# Patient Record
Sex: FEMALE | Race: WHITE | NOT HISPANIC OR LATINO | Employment: STUDENT | URBAN - METROPOLITAN AREA
[De-identification: names, ages, dates, MRNs, and addresses within clinical notes are randomized per-mention and may not be internally consistent; named-entity substitution may affect disease eponyms.]

---
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
## 2023-06-27 RX ORDER — LEVOCETIRIZINE DIHYDROCHLORIDE 5 MG/1
5 TABLET, FILM COATED ORAL
COMMUNITY

## 2023-06-27 RX ORDER — FLUTICASONE PROPIONATE 50 MCG
SPRAY, SUSPENSION (ML) NASAL
COMMUNITY

## 2023-06-27 RX ORDER — IBUPROFEN 200 MG
200 TABLET ORAL AS NEEDED
COMMUNITY
End: 2023-08-07 | Stop reason: ALTCHOICE

## 2023-07-23 PROBLEM — M25.561 PAIN IN RIGHT KNEE: Status: ACTIVE | Noted: 2022-05-17

## 2023-07-24 ENCOUNTER — OFFICE VISIT (OUTPATIENT)
Dept: FAMILY MEDICINE CLINIC | Facility: CLINIC | Age: 18
End: 2023-07-24

## 2023-07-24 VITALS
HEIGHT: 65 IN | SYSTOLIC BLOOD PRESSURE: 122 MMHG | DIASTOLIC BLOOD PRESSURE: 88 MMHG | WEIGHT: 138.6 LBS | HEART RATE: 70 BPM | OXYGEN SATURATION: 100 % | BODY MASS INDEX: 23.09 KG/M2

## 2023-07-24 DIAGNOSIS — Z11.59 ENCOUNTER FOR HEPATITIS C SCREENING TEST FOR LOW RISK PATIENT: ICD-10-CM

## 2023-07-24 DIAGNOSIS — Z00.00 WELL ADULT EXAM: Primary | ICD-10-CM

## 2023-07-24 DIAGNOSIS — J30.1 SEASONAL ALLERGIC RHINITIS DUE TO POLLEN: ICD-10-CM

## 2023-07-24 DIAGNOSIS — H52.13 MYOPIA OF BOTH EYES: ICD-10-CM

## 2023-07-24 DIAGNOSIS — R21 RASH AND NONSPECIFIC SKIN ERUPTION: ICD-10-CM

## 2023-07-24 DIAGNOSIS — Z11.4 SCREENING FOR HIV (HUMAN IMMUNODEFICIENCY VIRUS): ICD-10-CM

## 2023-07-24 PROBLEM — M25.561 PAIN IN RIGHT KNEE: Status: RESOLVED | Noted: 2022-05-17 | Resolved: 2023-07-24

## 2023-07-24 PROCEDURE — 99385 PREV VISIT NEW AGE 18-39: CPT | Performed by: NURSE PRACTITIONER

## 2023-07-24 NOTE — PROGRESS NOTES
ADULT ANNUAL [facility] IN PARTNERSHIP WITH [facility]    NAME: Cortney James  AGE: 19 y.o. SEX: female  : 2005     DATE: 2023     Assessment and Plan:     Problem List Items Addressed This Visit        Respiratory    Allergic rhinitis     Well-controlled with Xyzal            Musculoskeletal and Integument    Rash and nonspecific skin eruption     Patient reporting history of rash which occurs occasionally on her fingers, bilaterally on hands. She reports rash occurs as small "bumps "and turns into dry patches. Patient also reporting itchy legs, which occasionally occur as a rash as well. Patient without symptoms today. Advised patient on using dye and fragrance free lotions, washes, and detergent. She may use hydrocortisone on rash next time it appears. Patient is to make an appointment when the rash is occurring for better assessment. Patient verbalized understanding. Other    Encounter for hepatitis C screening test for low risk patient    Relevant Orders    Hepatitis C Ab W/Refl To HCV RNA, Qn, PCR    Myopia of both eyes    Screening for HIV (human immunodeficiency virus)    Relevant Orders    HIV 1/2 AG/AB W REFLEX [facility] and [facility] only    Well adult exam - Primary     Patient here for well exam and to establish care. Patient reports occasional rash on fingers and legs, but is not showing symptoms today. Patient denies further concerns at this time. Will order labs on patient, patient instructed to fast prior to completing labs. Advised patient on well-balanced diet and exercise goals of 30 minutes a day 5 days a week. Relevant Orders    CBC and differential    Comprehensive metabolic panel       Immunizations and preventive care screenings were discussed with patient today. Appropriate education was printed on patient's after visit summary.     Counseling:  Alcohol/drug use: discussed moderation in alcohol intake, the recommendations for healthy alcohol use, and avoidance of illicit drug use. Dental Health: discussed importance of regular tooth brushing, flossing, and dental visits. Injury prevention: discussed safety/seat belts, safety helmets, smoke detectors, carbon dioxide detectors, and smoking near bedding or upholstery. Sexual health: discussed sexually transmitted diseases, partner selection, use of condoms, avoidance of unintended pregnancy, and contraceptive alternatives. Exercise: the importance of regular exercise/physical activity was discussed. Recommend exercise 3-5 times per week for at least 30 minutes. Depression Screening and Follow-up Plan: Patient was screened for depression during today's encounter. They screened negative with a PHQ-2 score of 0. Return in about 1 year (around 7/24/2024) for Annual physical.     Chief Complaint:     Chief Complaint   Patient presents with   • Physical Exam     Est care      History of Present Illness:     Adult Annual Physical   Patient here for a comprehensive physical exam. The patient reports no problems. Diet and Physical Activity  Diet/Nutrition: well balanced diet. Exercise: 3-4 times a week on average. Depression Screening  PHQ-2/9 Depression Screening    Little interest or pleasure in doing things: 0 - not at all  Feeling down, depressed, or hopeless: 0 - not at all  PHQ-2 Score: 0  PHQ-2 Interpretation: Negative depression screen       General Health  Sleep: sleeps well. Hearing: normal - bilateral.  Vision: most recent eye exam <1 year ago, wears glasses and wears contacts. Dental: regular dental visits. Review of Systems:     Review of Systems   Constitutional: Negative. HENT: Negative. Eyes: Negative. Respiratory: Negative. Cardiovascular: Negative. Gastrointestinal: Negative. Genitourinary: Negative. Musculoskeletal: Negative. Skin: Negative. Neurological: Negative. Hematological: Negative. Psychiatric/Behavioral: Negative. Past Medical History:     History reviewed. No pertinent past medical history. Past Surgical History:     History reviewed. No pertinent surgical history. Social History:     Social History     Socioeconomic History   • Marital status: Single     Spouse name: None   • Number of children: None   • Years of education: None   • Highest education level: None   Occupational History   • None   Tobacco Use   • Smoking status: Never   • Smokeless tobacco: Never   Substance and Sexual Activity   • Alcohol use: Never   • Drug use: Never   • Sexual activity: Yes     Partners: Male     Birth control/protection: Condom Male   Other Topics Concern   • None   Social History Narrative   • None     Social Determinants of Health     Financial Resource Strain: Not on file   Food Insecurity: Not on file   Transportation Needs: Not on file   Physical Activity: Not on file   Stress: Not on file   Social Connections: Not on file   Intimate Partner Violence: Not on file   Housing Stability: Not on file      Family History:     Family History   Problem Relation Age of Onset   • Diabetes Father    • Cancer Maternal Grandmother         Ovraian Cancer      Current Medications:     Current Outpatient Medications   Medication Sig Dispense Refill   • fluticasone (Flonase Allergy Relief) 50 mcg/act nasal spray      • ibuprofen (MOTRIN) 200 mg tablet Take 200 mg by mouth if needed     • levocetirizine (XYZAL) 5 MG tablet Take 5 mg by mouth       No current facility-administered medications for this visit. Allergies: Allergies   Allergen Reactions   • Pollen Extract Other (See Comments)     Per mom, runny nose, watery, itchy eyes.       Physical Exam:     /88 (BP Location: Left arm, Patient Position: Sitting, Cuff Size: Standard)   Pulse 70   Ht 5' 5" (1.651 m)   Wt 62.9 kg (138 lb 9.6 oz)   SpO2 100%   BMI 23.06 kg/m²     Physical Exam  Vitals and nursing note reviewed. Constitutional:       General: She is not in acute distress. Appearance: Normal appearance. She is well-developed. HENT:      Head: Normocephalic and atraumatic. Right Ear: Tympanic membrane, ear canal and external ear normal. There is no impacted cerumen. Left Ear: Tympanic membrane, ear canal and external ear normal. There is no impacted cerumen. Nose: Nose normal. No congestion. Mouth/Throat:      Mouth: Mucous membranes are moist.   Eyes:      General:         Right eye: No discharge. Left eye: No discharge. Conjunctiva/sclera: Conjunctivae normal.      Pupils: Pupils are equal, round, and reactive to light. Cardiovascular:      Rate and Rhythm: Normal rate and regular rhythm. Heart sounds: No murmur heard. Pulmonary:      Effort: Pulmonary effort is normal. No respiratory distress. Breath sounds: Normal breath sounds. Abdominal:      General: Bowel sounds are normal.      Palpations: Abdomen is soft. Tenderness: There is no abdominal tenderness. There is no right CVA tenderness or left CVA tenderness. Musculoskeletal:         General: No swelling. Cervical back: Normal range of motion and neck supple. Right lower leg: No edema. Left lower leg: No edema. Skin:     General: Skin is warm and dry. Capillary Refill: Capillary refill takes less than 2 seconds. Neurological:      Mental Status: She is alert and oriented to person, place, and time. Psychiatric:         Mood and Affect: Mood normal.         Behavior: Behavior normal.         Thought Content:  Thought content normal.         Judgment: Judgment normal.          Tommie Torres, [address] WITH [facility]

## 2023-07-24 NOTE — ASSESSMENT & PLAN NOTE
Patient reporting history of rash which occurs occasionally on her fingers, bilaterally on hands. She reports rash occurs as small "bumps "and turns into dry patches. Patient also reporting itchy legs, which occasionally occur as a rash as well. Patient without symptoms today. Advised patient on using dye and fragrance free lotions, washes, and detergent. She may use hydrocortisone on rash next time it appears. Patient is to make an appointment when the rash is occurring for better assessment. Patient verbalized understanding.

## 2023-07-24 NOTE — ASSESSMENT & PLAN NOTE
Patient here for well exam and to establish care. Patient reports occasional rash on fingers and legs, but is not showing symptoms today. Patient denies further concerns at this time. Will order labs on patient, patient instructed to fast prior to completing labs. Advised patient on well-balanced diet and exercise goals of 30 minutes a day 5 days a week.

## 2023-07-24 NOTE — PATIENT INSTRUCTIONS
Wellness Visit for Adults   AMBULATORY CARE:   A wellness visit  is when you see your healthcare provider to get screened for health problems. Your healthcare provider will also give you advice on how to stay healthy. Write down your questions so you remember to ask them. Ask your healthcare provider how often you should have a wellness visit. What happens at a wellness visit:  Your healthcare provider will ask about your health, and your family history of health problems. This includes high blood pressure, heart disease, and cancer. He or she will ask if you have symptoms that concern you, if you smoke, and about your mood. You may also be asked about your intake of medicines, supplements, food, and alcohol. Any of the following may be done: Your weight  will be checked. Your height may also be checked so your body mass index (BMI) can be calculated. Your BMI shows if you are at a healthy weight. Your blood pressure  and heart rate will be checked. Your temperature may also be checked. Blood and urine tests  may be done. Blood tests may be done to check your cholesterol levels. Abnormal cholesterol levels increase your risk for heart disease and stroke. You may also need a blood or urine test to check for diabetes if you are at increased risk. Urine tests may be done to look for signs of an infection or kidney disease. A physical exam  includes checking your heartbeat and lungs with a stethoscope. Your healthcare provider may also check your skin to look for sun damage. Screening tests  may be recommended. A screening test is done to check for diseases that may not cause symptoms. The screening tests you may need depend on your age, gender, family history, and lifestyle habits. For example, colorectal screening may be recommended if you are 48years old or older. Screening tests you need if you are a woman:   A Pap smear  is used to screen for cervical cancer.  Pap smears are usually done every 3 to 5 years depending on your age. You may need them more often if you have had abnormal Pap smear test results in the past. Ask your healthcare provider how often you should have a Pap smear. A mammogram  is an x-ray of your breasts to screen for breast cancer. Experts recommend mammograms every 2 years starting at age 48 years. You may need a mammogram at age 52 years or younger if you have an increased risk for breast cancer. Talk to your healthcare provider about when you should start having mammograms and how often you need them. Vaccines you may need:   Get an influenza vaccine  every year. The influenza vaccine protects you from the flu. Several types of viruses cause the flu. The viruses change over time, so new vaccines are made each year. Get a tetanus-diphtheria (Td) booster vaccine  every 10 years. This vaccine protects you against tetanus and diphtheria. Tetanus is a severe infection that may cause painful muscle spasms and lockjaw. Diphtheria is a severe bacterial infection that causes a thick covering in the back of your mouth and throat. Get a human papillomavirus (HPV) vaccine  if you are female and aged 23 to 32 or male 23 to 24 and never received it. This vaccine protects you from HPV infection. HPV is the most common infection spread by sexual contact. HPV may also cause vaginal, penile, and anal cancers. Get a pneumococcal vaccine  if you are aged 72 years or older. The pneumococcal vaccine is an injection given to protect you from pneumococcal disease. Pneumococcal disease is an infection caused by pneumococcal bacteria. The infection may cause pneumonia, meningitis, or an ear infection. Get a shingles vaccine  if you are 60 or older, even if you have had shingles before. The shingles vaccine is an injection to protect you from the varicella-zoster virus. This is the same virus that causes chickenpox.  Shingles is a painful rash that develops in people who had chickenpox or have been exposed to the virus. How to eat healthy:  My Plate is a model for planning healthy meals. It shows the types and amounts of foods that should go on your plate. Fruits and vegetables make up about half of your plate, and grains and protein make up the other half. A serving of dairy is included on the side of your plate. The amount of calories and serving sizes you need depends on your age, gender, weight, and height. Examples of healthy foods are listed below:  Eat a variety of vegetables  such as dark green, red, and orange vegetables. You can also include canned vegetables low in sodium (salt) and frozen vegetables without added butter or sauces. Eat a variety of fresh fruits , canned fruit in 100% juice, frozen fruit, and dried fruit. Include whole grains. At least half of the grains you eat should be whole grains. Examples include whole-wheat bread, wheat pasta, brown rice, and whole-grain cereals such as oatmeal.    Eat a variety of protein foods such as seafood (fish and shellfish), lean meat, and poultry without skin (turkey and chicken). Examples of lean meats include pork leg, shoulder, or tenderloin, and beef round, sirloin, tenderloin, and extra lean ground beef. Other protein foods include eggs and egg substitutes, beans, peas, soy products, nuts, and seeds. Choose low-fat dairy products such as skim or 1% milk or low-fat yogurt, cheese, and cottage cheese. Limit unhealthy fats  such as butter, hard margarine, and shortening. Exercise:  Exercise at least 30 minutes per day on most days of the week. Some examples of exercise include walking, biking, dancing, and swimming. You can also fit in more physical activity by taking the stairs instead of the elevator or parking farther away from stores. Include muscle strengthening activities 2 days each week. Regular exercise provides many health benefits.  It helps you manage your weight, and decreases your risk for type 2 diabetes, heart disease, stroke, and high blood pressure. Exercise can also help improve your mood. Ask your healthcare provider about the best exercise plan for you. General health and safety guidelines:   Do not smoke. Nicotine and other chemicals in cigarettes and cigars can cause lung damage. Ask your healthcare provider for information if you currently smoke and need help to quit. E-cigarettes or smokeless tobacco still contain nicotine. Talk to your healthcare provider before you use these products. Limit alcohol. A drink of alcohol is 12 ounces of beer, 5 ounces of wine, or 1½ ounces of liquor. Lose weight, if needed. Being overweight increases your risk of certain health conditions. These include heart disease, high blood pressure, type 2 diabetes, and certain types of cancer. Protect your skin. Do not sunbathe or use tanning beds. Use sunscreen with a SPF 15 or higher. Apply sunscreen at least 15 minutes before you go outside. Reapply sunscreen every 2 hours. Wear protective clothing, hats, and sunglasses when you are outside. Drive safely. Always wear your seatbelt. Make sure everyone in your car wears a seatbelt. A seatbelt can save your life if you are in an accident. Do not use your cell phone when you are driving. This could distract you and cause an accident. Pull over if you need to make a call or send a text message. Practice safe sex. Use latex condoms if are sexually active and have more than one partner. Your healthcare provider may recommend screening tests for sexually transmitted infections (STIs). Wear helmets, lifejackets, and protective gear. Always wear a helmet when you ride a bike or motorcycle, go skiing, or play sports that could cause a head injury. Wear protective equipment when you play sports. Wear a lifejacket when you are on a boat or doing water sports.     © Copyright [employer] B[employer]bara 2022 Information is for End User's use only and may not be sold, redistributed or otherwise used for commercial purposes. The above information is an  only. It is not intended as medical advice for individual conditions or treatments. Talk to your doctor, nurse or pharmacist before following any medical regimen to see if it is safe and effective for you.

## 2023-08-07 ENCOUNTER — OFFICE VISIT (OUTPATIENT)
Dept: FAMILY MEDICINE CLINIC | Facility: CLINIC | Age: 18
End: 2023-08-07

## 2023-08-07 VITALS
SYSTOLIC BLOOD PRESSURE: 118 MMHG | HEIGHT: 65 IN | WEIGHT: 141.8 LBS | OXYGEN SATURATION: 99 % | DIASTOLIC BLOOD PRESSURE: 70 MMHG | HEART RATE: 66 BPM | BODY MASS INDEX: 23.63 KG/M2

## 2023-08-07 DIAGNOSIS — R21 RASH AND NONSPECIFIC SKIN ERUPTION: Primary | ICD-10-CM

## 2023-08-07 PROCEDURE — 99213 OFFICE O/P EST LOW 20 MIN: CPT | Performed by: NURSE PRACTITIONER

## 2023-08-07 RX ORDER — METHYLPREDNISOLONE 4 MG/1
TABLET ORAL
Qty: 21 EACH | Refills: 0
Start: 2023-08-07

## 2023-08-07 RX ORDER — TRIAMCINOLONE ACETONIDE 5 MG/G
CREAM TOPICAL 2 TIMES DAILY
Qty: 15 G | Refills: 0 | Status: SHIPPED | OUTPATIENT
Start: 2023-08-07

## 2023-08-07 NOTE — ASSESSMENT & PLAN NOTE
Patient with rash on legs bilaterally, more extensively on left leg. Patient reports rash has been present for 1 week, started on left knee and has extended to left leg and right leg. Patient does work in , and reports hand-foot-and-mouth disease has been traveling through the . No lesions noted in mouth, hands, feet. Discussed differentials with patient. Will give triamcinolone for pruritus, and Medrol Dosepak. Rash does appear to be viral, however given patient's previous rashes will send referral to dermatology at this time. If signs of infection arise, patient should follow back up in office. Follow-up in office if no improvement or worsening of symptoms.

## 2023-08-07 NOTE — PROGRESS NOTES
Name: Marc Elliott      : 2005      MRN: [id number]  Encounter Provider: NEERAJ Gaviria  Encounter Date: 2023   Encounter department: [address] [place] [address] IN PARTNERSHIP WITH [facility]    Assessment & Plan     1. Rash and nonspecific skin eruption  Assessment & Plan:  Patient with rash on legs bilaterally, more extensively on left leg. Patient reports rash has been present for 1 week, started on left knee and has extended to left leg and right leg. Patient does work in , and reports hand-foot-and-mouth disease has been traveling through the . No lesions noted in mouth, hands, feet. Discussed differentials with patient. Will give triamcinolone for pruritus, and Medrol Dosepak. Rash does appear to be viral, however given patient's previous rashes will send referral to dermatology at this time. If signs of infection arise, patient should follow back up in office. Follow-up in office if no improvement or worsening of symptoms. Orders:  -     triamcinolone (KENALOG) 0.5 % cream; Apply topically 2 (two) times a day  -     Ambulatory Referral to Dermatology; Future  -     methylPREDNISolone 4 MG tablet therapy pack; Use as directed on package           Subjective      Subjective    Marc Elliott is a 19 y.o. female who presents for evaluation of a rash involving the lower extremity. Rash started 1 weeks ago. Lesions are red, and raised in texture. Rash has changed over time. Rash is pruritic and sensitive. Associated symptoms: none. Patient denies: abdominal pain, arthralgia, congestion, cough, crankiness, decrease in appetite, fever, headache, irritability, myalgia, nausea, sore throat and vomiting. 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  Patient has not had new exposures (soaps, lotions, laundry detergents, foods, medications, plants, insects or animals). Review of Systems   Constitutional: Negative. HENT: Negative. Eyes: Negative. Respiratory: Negative. Cardiovascular: Negative. Gastrointestinal: Negative. Musculoskeletal: Negative. Skin: Positive for rash. Negative for color change, pallor and wound. Neurological: Negative. Psychiatric/Behavioral: Negative. Current Outpatient Medications on File Prior to Visit   Medication Sig   • fluticasone (Flonase Allergy Relief) 50 mcg/act nasal spray    • levocetirizine (XYZAL) 5 MG tablet Take 5 mg by mouth   • [DISCONTINUED] ibuprofen (MOTRIN) 200 mg tablet Take 200 mg by mouth if needed       Objective     /70 (BP Location: Left arm, Patient Position: Sitting, Cuff Size: Standard)   Pulse 66   Ht 5' 5" (1.651 m)   Wt 64.3 kg (141 lb 12.8 oz)   SpO2 99%   BMI 23.60 kg/m²     Physical Exam  Constitutional:       General: She is not in acute distress. Appearance: Normal appearance. HENT:      Head: Normocephalic and atraumatic. Right Ear: External ear normal.      Left Ear: External ear normal.      Nose: Nose normal.      Mouth/Throat:      Mouth: Mucous membranes are moist.      Pharynx: Oropharynx is clear. No oropharyngeal exudate or posterior oropharyngeal erythema. Eyes:      General:         Right eye: No discharge. Left eye: No discharge. Conjunctiva/sclera: Conjunctivae normal.   Cardiovascular:      Rate and Rhythm: Normal rate and regular rhythm. Heart sounds: Normal heart sounds. No murmur heard. Pulmonary:      Effort: Pulmonary effort is normal.      Breath sounds: Normal breath sounds. Abdominal:      General: Bowel sounds are normal.      Palpations: Abdomen is soft. Tenderness: There is no right CVA tenderness or left CVA tenderness. Musculoskeletal:         General: Normal range of motion. Cervical back: Normal range of motion. Right lower leg: No edema. Left lower leg: No edema. Lymphadenopathy:      Cervical: No cervical adenopathy. Skin:     General: Skin is warm and dry. Findings: Rash (scattered red raised patches and papules noted on left leg, red papules noted on right leg. No signs of infection noted. ) present. Neurological:      Mental Status: She is alert and oriented to person, place, and time. Psychiatric:         Mood and Affect: Mood normal.         Behavior: Behavior normal.         Thought Content:  Thought content normal.         Judgment: Judgment normal.       NEERAJ Mix

## 2023-08-07 NOTE — PATIENT INSTRUCTIONS
Acute Rash   WHAT YOU NEED TO KNOW:   A rash is irritated, red, or itchy skin or mucus membranes, such as the lining of your nose or throat. Acute means the rash starts suddenly, worsens quickly, and lasts a short time. Common causes include a disease or infection, a reaction to something you are allergic to, or certain medicines. DISCHARGE INSTRUCTIONS:   Return to the emergency department if:   You have sudden trouble breathing or chest pain. You are vomiting, have a headache or muscle aches, and your throat hurts. Call your doctor or dermatologist if:   You have a fever. You get open wounds from scratching your skin, or you have a wound that is red, swollen, or painful. Your rash lasts longer than 3 months. You have swelling or pain in your joints. You have questions or concerns about your condition or care. Medicines:  If your rash does not go away on its own, you may need the following medicines:  Antihistamines  may be given to help decrease itching. Steroids  may be given to decrease inflammation. Antibiotics  help fight or prevent a bacterial infection. Take your medicine as directed. Contact your healthcare provider if you think your medicine is not helping or if you have side effects. Tell your provider if you are allergic to any medicine. Keep a list of the medicines, vitamins, and herbs you take. Include the amounts, and when and why you take them. Bring the list or the pill bottles to follow-up visits. Carry your medicine list with you in case of an emergency. Prevent a rash or care for your skin when you have a rash:  Dry skin can lead to more problems. Do not scratch your skin if it itches. You may cause a skin infection by scratching. The following may prevent dry skin, and help your skin look better:  Help soothe your rash. Apply thick cream lotions or petroleum jelly. Cool compresses may also soothe your skin.  Apply a cool compress or a cool, wet towel, and then cover it with a dry towel. Use lukewarm water when you bathe. Hot water may damage your skin more. Pat your skin dry. Do not rub your skin with a towel. Use detergents, soaps, shampoos, and bubble baths  made for sensitive skin. Wear clothes made of cotton instead of nylon or wool. Cotton is softer, so it will not hurt your skin as much. Follow up with your healthcare providers as directed:  A dermatologist may help find the cause of your rash or help plan or change treatment. A dietitian may help with meal planning if you have a food allergy. Write down your questions so you remember to ask them during your visits. © Copyright [facility] 2022 Information is for End User's use only and may not be sold, redistributed or otherwise used for commercial purposes. The above information is an  only. It is not intended as medical advice for individual conditions or treatments. Talk to your doctor, nurse or pharmacist before following any medical regimen to see if it is safe and effective for you.

## 2023-08-28 DIAGNOSIS — N92.0 MENORRHAGIA WITH REGULAR CYCLE: ICD-10-CM

## 2023-08-28 DIAGNOSIS — N94.6 MENSTRUAL CRAMPS: Primary | ICD-10-CM

## 2023-09-08 ENCOUNTER — OFFICE VISIT (OUTPATIENT)
Age: 18
End: 2023-09-08
Payer: COMMERCIAL

## 2023-09-08 VITALS
BODY MASS INDEX: 23.79 KG/M2 | WEIGHT: 142.8 LBS | HEIGHT: 65 IN | DIASTOLIC BLOOD PRESSURE: 70 MMHG | SYSTOLIC BLOOD PRESSURE: 120 MMHG

## 2023-09-08 DIAGNOSIS — Z11.3 SCREEN FOR STD (SEXUALLY TRANSMITTED DISEASE): ICD-10-CM

## 2023-09-08 DIAGNOSIS — N94.6 MENSTRUAL CRAMPS: Primary | ICD-10-CM

## 2023-09-08 PROCEDURE — 99203 OFFICE O/P NEW LOW 30 MIN: CPT | Performed by: PHYSICIAN ASSISTANT

## 2023-09-08 PROCEDURE — 87591 N.GONORRHOEAE DNA AMP PROB: CPT | Performed by: PHYSICIAN ASSISTANT

## 2023-09-08 PROCEDURE — 87491 CHLMYD TRACH DNA AMP PROBE: CPT | Performed by: PHYSICIAN ASSISTANT

## 2023-09-08 RX ORDER — NORETHINDRONE ACETATE AND ETHINYL ESTRADIOL 1; .02 MG/1; MG/1
1 TABLET ORAL DAILY
Qty: 30 TABLET | Refills: 3 | Status: SHIPPED | OUTPATIENT
Start: 2023-09-08

## 2023-09-08 NOTE — PATIENT INSTRUCTIONS
Birth Control Pill Instructions    1. Pills may be started on the first day of your period or the Sunday after the first day of your period. 2. It is important that you take the pill at the same time each day. 3. If you miss a pill, take it as soon as you remember it and continue pills as usual.  If you miss 2 days, double up the next 2 days, one in the AM and one in the PM.  If you miss more than 2 days, stop the pills, wait for a period, then start a new pack on Sunday following the first day of your period or on the first day of your period. 4. Your period may be lighter and shorter on the birth control pills. Any spotting is considered a period if it is at the time you should have your period. 5. Bleeding other than a period is likely to happen the first 2 to 3 cycles on the pill. Continue taking the pill and most often it resolves. If it persists past 3 months, call the office. 6. If no bleeding occurs during the week you should get your menses, have a pregnancy test done (you can do a urine test) before starting a new pack. If no bleeding occurs for 2 consecutive months, please call the office. 7. Do not count on the pill for birth control until you have been on it for one month. 8. If you miss a pill or take a pill late, please use back up birth control for the rest of the pack. 9. Please remember antibiotics can decrease the effectiveness of the pill. Use back up birth control for the rest of the pack if you are taking an antibiotic.

## 2023-09-08 NOTE — PROGRESS NOTES
Patient c/o worsening cramps with her menses. Patient says she has always had cramps with her menses but they seem to be getting worse. Pain starts a few hours before the bleeding and bad X 2 days. Pain goes into legs. Worse in left leg than right leg. Menses regular every 29 days and last 4-5 days. Changing super tampon every 5 hours. Patient says her mother would never let her miss school/ work for the cramps. She has no STD concerns. She has no pain with intercourse. Denies vaginal discharge, itching, odor. She is using condoms for birth control. Has taken ibuprofen with minimal relief. No smoking  No migraine with aura  No hx or family history blood clot/stroke    Discussed option of birth control for cramps: pills vs patch, vs ring, vs fariba, vs shot vs IUD. Discussed ibuprofen. Discussed laparoscopy. Patient would like to try OCP. Pill instructions reviewed and hand out given. Reviewed side effects including but not limited to nausea/ headache, blood clot, stroke, weight gain. Discussed that while the pill provides protection against pregnancy it does not protect against STD. Patient declines pelvic exam today. Abdomen is soft , non tender to palpation    Assessment:  Dysmenorrhea    PLan:  loestrin 1/20 # 30 one daily 3RF  F/u in 3 months  Recommend gardasil vaccine  Gonorrhea/ chlamydia collected today. Total time spent today 35 minutes. Greater than 50% of total time was spent with the patient and / or family counseling and / or coordination of care.

## 2023-09-09 ENCOUNTER — PATIENT MESSAGE (OUTPATIENT)
Age: 18
End: 2023-09-09

## 2023-09-09 DIAGNOSIS — Z80.41 FAMILY HISTORY OF OVARIAN CANCER: Primary | ICD-10-CM

## 2023-09-11 ENCOUNTER — TELEPHONE (OUTPATIENT)
Dept: HEMATOLOGY ONCOLOGY | Facility: CLINIC | Age: 18
End: 2023-09-11

## 2023-09-11 LAB
C TRACH DNA SPEC QL NAA+PROBE: NEGATIVE
N GONORRHOEA DNA SPEC QL NAA+PROBE: NEGATIVE

## 2023-09-11 NOTE — TELEPHONE ENCOUNTER
From: Meagan Dickson  To: Alejandro Ryan  Sent: 9/9/2023 6:47 AM EDT  Subject: Genetic Testing     Good morning,  After thinking about everything from yesterday’s appointment, I would like to go through with the referral to the genetic testing for the ovarian cancer, just so we can see if there is a possibility of me having that.      Thank you,  Meagan Dickson

## 2023-09-11 NOTE — TELEPHONE ENCOUNTER
I spoke with Cortney to schedule their consultation with Cancer Risk and Genetics. Scheduling Outcome: Patient is scheduled for an appointment on 2/22/24 at 2:00PM with Marily Sanders    Personal/Family History Related to Appointment:     Personal History of Cancer: Patient reports no personal history of cancer    Family History of Cancer: Patient reports family history of ovarian ca grandmother m, skin ca father    Is patient of [address] [place] [address] [address]?: No    History of Genetic Testing:  Personal History of Genetic Testing: Patient report no personal history of Genetic Testing. Family History of Genetic Testing: Patient reports that member(s) of their family have had Genetic Testing. Details: Patient believes that her mother has done genetic testing but is unaware of the details. [employer]:  Is patient able to complete our family history questionnaire on a computer?:  Yes    Patient's preferred e-mail address: [email or web address]

## 2023-09-22 PROBLEM — Z00.00 WELL ADULT EXAM: Status: RESOLVED | Noted: 2023-07-24 | Resolved: 2023-09-22

## 2023-12-07 NOTE — PROGRESS NOTES
Pt is here for follow up. She is taking loestrin 1/20 for dysmenorrhea. She says the cramps are greatly improved. The bleeding is less. The 1st two pack got menses during 3rd week. On 3rd week of pills now and no bleeding this month. She is no having any spotting or bleeding in between menses. She denies headache, blurry vision, abdominal pain, leg pain, or chest pain. She is happy with the Loestrin 1/20 and wishes to continue. We did talk about taking the pill continuously if desired. Patient has had some nipple itching on left side. Has hx of dry skin. She will try moisturizers and hydrocortisone cream.  Will call if persists. Declines breast exam.    Hx migraine with aura: no  Hx blood clot/ stroke: no  Smoker: no    Current Outpatient Medications on File Prior to Visit   Medication Sig Dispense Refill    fluticasone (Flonase Allergy Relief) 50 mcg/act nasal spray       Junel FE 1/20 1-20 MG-MCG per tablet TAKE 1 TABLET BY MOUTH DAILY PLEASE GIVE 28 DAY PACK 84 tablet 0    levocetirizine (XYZAL) 5 MG tablet Take 5 mg by mouth      triamcinolone (KENALOG) 0.5 % cream Apply topically 2 (two) times a day 15 g 0     No current facility-administered medications on file prior to visit. Vitals:    12/08/23 0658   BP: 116/64   Pulse: 81   Temp: 97.9 °F (36.6 °C)   SpO2: 98%       Assessment:  dysmenorrhea    Plan:  Continue loestrin 1`/20  Loestrin 1/20 # 84 one daily 3RF  F/u 1 year or sooner if needed  10 minutes of face to face time spent with patient, >50% of which was spent discussing dysmenorrhea.
No

## 2023-12-08 ENCOUNTER — OFFICE VISIT (OUTPATIENT)
Age: 18
End: 2023-12-08

## 2023-12-08 VITALS
OXYGEN SATURATION: 98 % | HEART RATE: 81 BPM | BODY MASS INDEX: 24.79 KG/M2 | TEMPERATURE: 97.9 F | SYSTOLIC BLOOD PRESSURE: 116 MMHG | DIASTOLIC BLOOD PRESSURE: 64 MMHG | HEIGHT: 65 IN | WEIGHT: 148.8 LBS

## 2023-12-08 DIAGNOSIS — N94.6 MENSTRUAL CRAMPS: ICD-10-CM

## 2023-12-08 DIAGNOSIS — N94.6 DYSMENORRHEA: Primary | ICD-10-CM

## 2023-12-08 RX ORDER — NORETHINDRONE ACETATE AND ETHINYL ESTRADIOL 1MG-20(21)
1 KIT ORAL DAILY
Qty: 84 TABLET | Refills: 3 | Status: SHIPPED | OUTPATIENT
Start: 2023-12-08

## 2023-12-15 ENCOUNTER — TELEPHONE (OUTPATIENT)
Dept: OBGYN CLINIC | Facility: CLINIC | Age: 18
End: 2023-12-15

## 2023-12-15 NOTE — TELEPHONE ENCOUNTER
----- Message from Marcie Morgan MA sent at 12/15/2023 11:30 AM EST -----  Regarding: FW: BC & Period  Contact: [phone]    ----- Message -----  From: Cortney James  Sent: 12/15/2023   8:29 AM EST  To: #  Subject: BC & Period                                      Good Morning,  This is Cortney James. I wanted to send a message because we had talked about how I should be getting my period during the last week of pills. I had light spotting on 12/12 and 12/13, which is during my last week of pills, but have had nothing else. I’m not sure if that is my “period” or if it’s something else. I just wanted to message to ask.     Thank you,    Cortney James

## 2023-12-15 NOTE — TELEPHONE ENCOUNTER
Left message on machine for pt to call office re: patient's message     Will respond via my chart

## 2024-01-02 ENCOUNTER — TELEPHONE (OUTPATIENT)
Dept: OBGYN CLINIC | Facility: CLINIC | Age: 19
End: 2024-01-02

## 2024-01-02 ENCOUNTER — PATIENT MESSAGE (OUTPATIENT)
Age: 19
End: 2024-01-02

## 2024-01-02 NOTE — TELEPHONE ENCOUNTER
----- Message from Sisi Al LPN sent at 1/2/2024  7:17 AM EST -----  Regarding: FW: Question/Concern  Contact: [phone]  Please advise  ----- Message -----  From: Cortney James  Sent: 1/2/2024   7:12 AM EST  To: #  Subject: Question/Concern                                 Good Morning,   I did look at my birth control and it does say the same thing in the bottom as my pack I am on right now.     I also had another question, over the weekend I started to have brown spotting and I still have it and I am not due for my period until 1/9/23 and I’m not sure why the spotting is happening.    Thank you,   Cortney James

## 2024-01-02 NOTE — PATIENT COMMUNICATION
Spoke with patient.   Had BTB on 2nd week of pills.  No missed pills, no antibiotics.  Patient just picked up another 3 packs of pills.  She will continue for now and call if continued BTB.  If continued will want to switch OCP.   She will finish pack since just light spotting.  If bleeding gets heavy like menses will call and then will have patient stop pack early.    Patient will do HPT but doubts chance of pregnancy.

## 2024-02-22 ENCOUNTER — DOCUMENTATION (OUTPATIENT)
Dept: GENETICS | Facility: CLINIC | Age: 19
End: 2024-02-22

## 2024-02-22 ENCOUNTER — CLINICAL SUPPORT (OUTPATIENT)
Dept: GENETICS | Facility: CLINIC | Age: 19
End: 2024-02-22
Payer: COMMERCIAL

## 2024-02-22 DIAGNOSIS — Z80.41 FAMILY HISTORY OF OVARIAN CANCER: ICD-10-CM

## 2024-02-22 PROCEDURE — 36415 COLL VENOUS BLD VENIPUNCTURE: CPT

## 2024-02-22 NOTE — PROGRESS NOTES
"Pre-Test Genetic Counseling Consult Note    Patient Name: Cortney James   /Age: 2005/18 y.o.  Referring Provider: Lico Queen PA-C     Date of Service: 2024  Genetic Counselor: Chloe Christian MS, [facility] [place]  Interpretation Services: None  Location: In-person consult at [place]  Length of Visit: 30 Minutes    Cortney was referred to the [facility] Cancer Risk and Genetic Assessment Program due to her family history of ovarian cancer . she presents today to discuss the possibility of a hereditary cancer syndrome, options for genetic testing, and implications for her and her family.         Cancer History and Treatment:   Personal History:   Oncology History    No history exists.       Screening Hx:   Breast:  Breast Imaging: none    Colon:  Colonoscopy: none    Gynecologic:  Ovaries and Uterus intact     Skin:  Saw derm over the summer    Other screening: none    Reproductive History  Age at menarche: 11  Age at first live birth: Nulliparous  Menopause: Premenopausal  Hormone replacement: none    Medical and Surgical History  Pertinent surgical history:   Past Surgical History:   Procedure Laterality Date    ELBOW SURGERY      WISDOM TOOTH EXTRACTION        Pertinent medical history:No past medical history on file.      Other History:  Height:   Ht Readings from Last 1 Encounters:   23 5' 5\" (1.651 m)     Weight:   Wt Readings from Last 1 Encounters:   23 67.5 kg (148 lb 12.8 oz)       Relevant Family History   Patient reports no Ashkenazi Baptist ancestry.     Maternal Family History:  Grandmother (d.late 60s) history of ovarian cancer (dx early 60s)   Great grandfather with history of lung cancer    Paternal Family History:   Father (53) history of basal cell carcinoma   Great grandfather with history of lung cancer    Please refer to the scanned pedigree in the Media Tab for a complete family history     *All history is reported as provided by the patient; records are not available for " review, except where indicated.     Assessment:  We discussed sporadic, familial and hereditary cancer.  We also discussed the many factors that influence our risk for cancer such as age, environmental exposures, lifestyle choices and family history.      We reviewed the indications suggestive of a hereditary predisposition to cancer.    Cortney is unaffected, but is considering genetic testing due to a family history of ovarian cancer. Although Cortney's maternal grandmother is the most informative person to undergo genetic testing, Cortney can consider genetic testing due to the following:   Patient does not have cancer but is the most informative person to test because their grandmother is .    Genetic testing is indicated for Cortney based on the following criteria: Meets NCCN V2.2024 Testing Criteria for Ovarian Cancer Susceptibility Genes: family history of a second-degree relative diagnosed with ovarian cancer    The risks, benefits, and limitations of genetic testing were reviewed with the patient, as well as genetic discrimination laws, and possible test results (positive, negative, variants of uncertain significance) and their clinical implications. If positive for a mutation, options for managing cancer risk including increased surveillance, chemoprevention, and in some cases prophylactic surgery were discussed. Cortney was informed that if a hereditary cancer syndrome was identified in her, first degree relatives (parents, siblings, and children) have a chance of also inheriting the condition. Genetic testing would allow for predictive genetic testing in other relatives, who may also be at risk depending on their degree of relation.     Billing:  Most individuals pay <$100 for hereditary cancer genetic testing. If insurance covers the cost of the testing, individuals may still pay out of pocket secondary to co-pays, co-insurance, or deductibles. If the cost of the testing exceeds $100, the lab  will reach out to the patient via phone or e-mail. The patient will then have the option to proceed with the testing, cancel the testing, or elect the self-pay option of $250. Cortney verbalized understanding.     Plan: Patient decided to proceed with testing and provided consent.    Summary:     Sample Collection:  The patient's blood sample was drawn in the office on 2/22 by medical assistant Gaston Simmons    Genetic Testing Preformed: CustomNext: Cancer® +Collaborative Software Initiativensight® (59 genes): APC, JOAN, AXIN2, BAP1, BARD1, BMPR1A, BRCA1, BRCA2, BRIP1, CDH1, CDK4, CDKN1B, CDKN2A, CHEK2, CTNNA1, DICER1, EGLN1, EPCAM, FH, FLCN, GREM1, HOXB13, KIF1B, KIT, MAX, MEN1, MET, MITF, MLH1, MSH2, MSH3, MSH6, MUTYH, NF1, NTHL1, PALB2, PDGFRA PMS2, POLD1, POLE, POT1, PTEN, RAD51C, RAD51D, RB1, RET, SDHA, SDHAF2, SDHB, SDHC, SDHD, SMAD4, SMARCA4, STK11, [id number], TP53, TSC1, TSC2, VHL     Result Call Information:  In the event that we need to reach Cortney via telephone:  I confirmed the patient's mobile number on file as the best number to call with results  I confirmed with the patient that we can leave a voicemail on the provided numbers    Results take approximately 2-3 weeks to complete once test is started.    Cortney will be notified via [employer] once results are available.      Additional recommendations for surveillance/medical management will be made pending genetic test results.

## 2024-02-22 NOTE — LETTER
"2024     Lico Queen PA-C  [address]  [address]  [place] [id number]    Patient: Cortney James  YOB: 2005  Date of Visit: 2024      Dear Dr. Queen:    Thank you for referring Cortney James to me for evaluation. Below are my notes for this consultation.    If you have questions, please do not hesitate to call me. I look forward to following your patient along with you.         Sincerely,        Chloe Christian        CC: NEERAJ Dorsey        Pre-Test Genetic Counseling Consult Note    Patient Name: Cortney James   /Age: 2005/18 y.o.  Referring Provider: Lico Queen PA-C     Date of Service: 2024  Genetic Counselor: Chloe Christian MS, [facility] [place]  Interpretation Services: None  Location: In-person consult at [place]  Length of Visit: 30 Minutes    Cortney was referred to the [facility] Cancer Risk and Genetic Assessment Program due to her family history of ovarian cancer . she presents today to discuss the possibility of a hereditary cancer syndrome, options for genetic testing, and implications for her and her family.         Cancer History and Treatment:   Personal History:   Oncology History    No history exists.       Screening Hx:   Breast:  Breast Imaging: none    Colon:  Colonoscopy: none    Gynecologic:  Ovaries and Uterus intact     Skin:  Saw derm over the summer    Other screening: none    Reproductive History  Age at menarche: 11  Age at first live birth: Nulliparous  Menopause: Premenopausal  Hormone replacement: none    Medical and Surgical History  Pertinent surgical history:   Past Surgical History:   Procedure Laterality Date   • ELBOW SURGERY     • WISDOM TOOTH EXTRACTION        Pertinent medical history:No past medical history on file.      Other History:  Height:   Ht Readings from Last 1 Encounters:   23 5' 5\" (1.651 m)     Weight:   Wt Readings from Last 1 Encounters:   23 67.5 kg (148 lb 12.8 " oz)       Relevant Family History   Patient reports no Ashkenazi Latter day ancestry.     Maternal Family History:  Grandmother (d.late 60s) history of ovarian cancer (dx early 60s)   Great grandfather with history of lung cancer    Paternal Family History:   Father (53) history of basal cell carcinoma   Great grandfather with history of lung cancer    Please refer to the scanned pedigree in the Media Tab for a complete family history     *All history is reported as provided by the patient; records are not available for review, except where indicated.     Assessment:  We discussed sporadic, familial and hereditary cancer.  We also discussed the many factors that influence our risk for cancer such as age, environmental exposures, lifestyle choices and family history.      We reviewed the indications suggestive of a hereditary predisposition to cancer.    Cortney is unaffected, but is considering genetic testing due to a family history of ovarian cancer. Although Cortney's maternal grandmother is the most informative person to undergo genetic testing, Cortney can consider genetic testing due to the following:   Patient does not have cancer but is the most informative person to test because their grandmother is .    Genetic testing is indicated for Cortney based on the following criteria: Meets NCCN V2.2024 Testing Criteria for Ovarian Cancer Susceptibility Genes: family history of a second-degree relative diagnosed with ovarian cancer    The risks, benefits, and limitations of genetic testing were reviewed with the patient, as well as genetic discrimination laws, and possible test results (positive, negative, variants of uncertain significance) and their clinical implications. If positive for a mutation, options for managing cancer risk including increased surveillance, chemoprevention, and in some cases prophylactic surgery were discussed. Cortney was informed that if a hereditary cancer syndrome was identified  in her, first degree relatives (parents, siblings, and children) have a chance of also inheriting the condition. Genetic testing would allow for predictive genetic testing in other relatives, who may also be at risk depending on their degree of relation.     Billing:  Most individuals pay <$100 for hereditary cancer genetic testing. If insurance covers the cost of the testing, individuals may still pay out of pocket secondary to co-pays, co-insurance, or deductibles. If the cost of the testing exceeds $100, the lab will reach out to the patient via phone or e-mail. The patient will then have the option to proceed with the testing, cancel the testing, or elect the self-pay option of $250. Cortney verbalized understanding.     Plan: Patient decided to proceed with testing and provided consent.    Summary:     Sample Collection:  The patient's blood sample was drawn in the office on 2/22 by medical assistant Gaston Simmons    Genetic Testing Preformed: CustomNext: Cancer® +RNAinsight® (59 genes): APC, JOAN, AXIN2, BAP1, BARD1, BMPR1A, BRCA1, BRCA2, BRIP1, CDH1, CDK4, CDKN1B, CDKN2A, CHEK2, CTNNA1, DICER1, EGLN1, EPCAM, FH, FLCN, GREM1, HOXB13, KIF1B, KIT, MAX, MEN1, MET, MITF, MLH1, MSH2, MSH3, MSH6, MUTYH, NF1, NTHL1, PALB2, PDGFRA PMS2, POLD1, POLE, POT1, PTEN, RAD51C, RAD51D, RB1, RET, SDHA, SDHAF2, SDHB, SDHC, SDHD, SMAD4, SMARCA4, STK11, [id number], TP53, TSC1, TSC2, VHL     Result Call Information:  In the event that we need to reach Cortney via telephone:  I confirmed the patient's mobile number on file as the best number to call with results  I confirmed with the patient that we can leave a voicemail on the provided numbers    Results take approximately 2-3 weeks to complete once test is started.    Cortney will be notified via [email or web address] once results are available.      Additional recommendations for surveillance/medical management will be made pending genetic test results.

## 2024-02-26 DIAGNOSIS — J02.0 RECURRENT STREPTOCOCCAL PHARYNGITIS: Primary | ICD-10-CM

## 2024-03-14 PROBLEM — Z80.41 FAMILY HISTORY OF OVARIAN CANCER: Status: ACTIVE | Noted: 2024-03-14

## 2024-06-12 ENCOUNTER — CLINICAL SUPPORT (OUTPATIENT)
Dept: FAMILY MEDICINE CLINIC | Facility: CLINIC | Age: 19
End: 2024-06-12

## 2024-06-12 DIAGNOSIS — Z11.1 PPD SCREENING TEST: Primary | ICD-10-CM

## 2024-06-12 DIAGNOSIS — Z11.59 NEED FOR HEPATITIS C SCREENING TEST: ICD-10-CM

## 2024-06-12 DIAGNOSIS — Z12.4 SCREENING FOR CERVICAL CANCER: ICD-10-CM

## 2024-06-12 DIAGNOSIS — Z11.4 SCREENING FOR HIV (HUMAN IMMUNODEFICIENCY VIRUS): ICD-10-CM

## 2024-06-12 PROCEDURE — 86580 TB INTRADERMAL TEST: CPT

## 2024-06-14 ENCOUNTER — CLINICAL SUPPORT (OUTPATIENT)
Dept: FAMILY MEDICINE CLINIC | Facility: CLINIC | Age: 19
End: 2024-06-14

## 2024-06-14 DIAGNOSIS — Z11.1 PPD SCREENING TEST: Primary | ICD-10-CM

## 2024-06-14 LAB
INDURATION: 0 MM
TB SKIN TEST: NEGATIVE

## 2024-06-14 NOTE — PROGRESS NOTES
PPD Reading Note  PPD read and results entered in [employer].  Result: 0 mm induration.  Interpretation: negative  If test not read within 48-72 hours of initial placement, patient advised to repeat in other arm 1-3 weeks after this test.  Allergic reaction: no

## 2024-08-01 ENCOUNTER — OFFICE VISIT (OUTPATIENT)
Dept: FAMILY MEDICINE CLINIC | Facility: CLINIC | Age: 19
End: 2024-08-01

## 2024-08-01 VITALS
HEIGHT: 65 IN | SYSTOLIC BLOOD PRESSURE: 114 MMHG | HEART RATE: 100 BPM | BODY MASS INDEX: 23.82 KG/M2 | OXYGEN SATURATION: 100 % | DIASTOLIC BLOOD PRESSURE: 66 MMHG | WEIGHT: 143 LBS

## 2024-08-01 DIAGNOSIS — Z00.00 ANNUAL PHYSICAL EXAM: Primary | ICD-10-CM

## 2024-08-01 PROCEDURE — 99395 PREV VISIT EST AGE 18-39: CPT | Performed by: NURSE PRACTITIONER

## 2024-08-01 NOTE — PROGRESS NOTES
Adult Annual Physical  Name: Cortney James      : 2005      MRN: [id number]  Encounter Provider: NEERAJ Dorsey  Encounter Date: 2024   Encounter department: [facility] PARTNERSHIP WITH [facility]    Assessment & Plan   1. Annual physical exam  Assessment & Plan:  Advice and education were given regarding nutrition, aerobic exercises, weight bearing exercises, cardiovascular risk reduction, fall risk reduction, and age appropriate supplements. The patient was counseled regarding instructions for management, risk factor reductions, prognosis, risks and benefits of treatment options, patient and family education, and importance of compliance with treatment.    Orders:  -     CBC and differential; Future  -     Comprehensive metabolic panel; Future  2. BMI 23.0-23.9, adult  Immunizations and preventive care screenings were discussed with patient today. Appropriate education was printed on patient's after visit summary.    Counseling:  Alcohol/drug use: discussed moderation in alcohol intake, the recommendations for healthy alcohol use, and avoidance of illicit drug use.  Dental Health: discussed importance of regular tooth brushing, flossing, and dental visits.  Injury prevention: discussed safety/seat belts, safety helmets, smoke detectors, carbon dioxide detectors, and smoking near bedding or upholstery.  Sexual health: discussed sexually transmitted diseases, partner selection, use of condoms, avoidance of unintended pregnancy, and contraceptive alternatives.  Exercise: the importance of regular exercise/physical activity was discussed. Recommend exercise 3-5 times per week for at least 30 minutes.       Depression Screening and Follow-up Plan: Patient was screened for depression during today's encounter. They screened negative with a PHQ-2 score of 0.        History of Present Illness     Adult Annual Physical:  Patient presents for annual  "physical.     Diet and Physical Activity:  - Diet/Nutrition: well balanced diet.  - Exercise: 5-7 times a week on average.    Depression Screening:  - PHQ-2 Score: 0    General Health:  - Sleep: sleeps well.  - Hearing: normal hearing bilateral ears.  - Vision: no vision problems.    /GYN Health:  - Follows with GYN: yes.   - Menopause: premenopausal.   - Contraception:. See [facility]      Review of Systems   Constitutional: Negative.  Negative for chills and fever.   HENT: Negative.  Negative for ear pain and sore throat.    Eyes: Negative.  Negative for pain and visual disturbance.   Respiratory: Negative.  Negative for cough and shortness of breath.    Cardiovascular:  Negative for chest pain and palpitations.   Gastrointestinal:  Negative for abdominal pain and vomiting.   Genitourinary:  Negative for dysuria and hematuria.   Musculoskeletal:  Negative for arthralgias and back pain.   Skin:  Negative for color change and rash.   Neurological: Negative.  Negative for seizures and syncope.   Psychiatric/Behavioral: Negative.     All other systems reviewed and are negative.        Objective     /66 (BP Location: Left arm, Patient Position: Sitting, Cuff Size: Standard)   Pulse 100   Ht 5' 5\" (1.651 m)   Wt 64.9 kg (143 lb)   SpO2 100%   BMI 23.80 kg/m²     Physical Exam  Vitals and nursing note reviewed.   Constitutional:       General: She is not in acute distress.     Appearance: She is well-developed.   HENT:      Head: Normocephalic and atraumatic.      Right Ear: Tympanic membrane, ear canal and external ear normal.      Left Ear: Tympanic membrane, ear canal and external ear normal.      Nose: Nose normal.      Mouth/Throat:      Mouth: Mucous membranes are moist.      Pharynx: Oropharynx is clear.   Eyes:      General:         Right eye: No discharge.         Left eye: No discharge.      Conjunctiva/sclera: Conjunctivae normal.      Pupils: Pupils are equal, round, and reactive to light. "   Cardiovascular:      Rate and Rhythm: Normal rate and regular rhythm.      Heart sounds: No murmur heard.  Pulmonary:      Effort: Pulmonary effort is normal. No respiratory distress.      Breath sounds: Normal breath sounds.   Abdominal:      General: Bowel sounds are normal.      Palpations: Abdomen is soft.      Tenderness: There is no abdominal tenderness.   Musculoskeletal:         General: No swelling.      Cervical back: Neck supple.      Right lower leg: No edema.      Left lower leg: No edema.   Skin:     General: Skin is warm and dry.      Capillary Refill: Capillary refill takes less than 2 seconds.   Neurological:      Mental Status: She is alert and oriented to person, place, and time.   Psychiatric:         Mood and Affect: Mood normal.         Behavior: Behavior normal.         Thought Content: Thought content normal.         Judgment: Judgment normal.       Administrative Statements   I have spent a total time of 40 minutes in caring for this patient on the day of the visit/encounter including Instructions for management, Patient and family education, Importance of tx compliance, Risk factor reductions, Impressions, Documenting in the medical record, and Reviewing / ordering tests, medicine, procedures  .

## 2024-08-01 NOTE — PATIENT INSTRUCTIONS
"Patient Education     Routine physical for adults   The Basics   Written by the doctors and editors at [facility]   What is a physical? -- A physical is a routine visit, or \"check-up,\" with your doctor. You might also hear it called a \"wellness visit\" or \"preventive visit.\"  During each visit, the doctor will:   Ask about your physical and mental health   Ask about your habits, behaviors, and lifestyle   Do an exam   Give you vaccines if needed   Talk to you about any medicines you take   Give advice about your health   Answer your questions  Getting regular check-ups is an important part of taking care of your health. It can help your doctor find and treat any problems you have. But it's also important for preventing health problems.  A routine physical is different from a \"sick visit.\" A sick visit is when you see a doctor because of a health concern or problem. Since physicals are scheduled ahead of time, you can think about what you want to ask the doctor.  How often should I get a physical? -- It depends on your age and health. In general, for people age 21 years and older:   If you are younger than 50 years, you might be able to get a physical every 3 years.   If you are 50 years or older, your doctor might recommend a physical every year.  If you have an ongoing health condition, like diabetes or high blood pressure, your doctor will probably want to see you more often.  What happens during a physical? -- In general, each visit will include:   Physical exam - The doctor or nurse will check your height, weight, heart rate, and blood pressure. They will also look at your eyes and ears. They will ask about how you are feeling and whether you have any symptoms that bother you.   Medicines - It's a good idea to bring a list of all the medicines you take to each doctor visit. Your doctor will talk to you about your medicines and answer any questions. Tell them if you are having any side effects that bother you. You " "should also tell them if you are having trouble paying for any of your medicines.   Habits and behaviors - This includes:   Your diet   Your exercise habits   Whether you smoke, drink alcohol, or use drugs   Whether you are sexually active   Whether you feel safe at home  Your doctor will talk to you about things you can do to improve your health and lower your risk of health problems. They will also offer help and support. For example, if you want to quit smoking, they can give you advice and might prescribe medicines. If you want to improve your diet or get more physical activity, they can help you with this, too.   Lab tests, if needed - The tests you get will depend on your age and situation. For example, your doctor might want to check your:   Cholesterol   Blood sugar   Iron level   Vaccines - The recommended vaccines will depend on your age, health, and what vaccines you already had. Vaccines are very important because they can prevent certain serious or deadly infections.   Discussion of screening - \"Screening\" means checking for diseases or other health problems before they cause symptoms. Your doctor can recommend screening based on your age, risk, and preferences. This might include tests to check for:   Cancer, such as breast, prostate, cervical, ovarian, colorectal, prostate, lung, or skin cancer   Sexually transmitted infections, such as chlamydia and gonorrhea   Mental health conditions like depression and anxiety  Your doctor will talk to you about the different types of screening tests. They can help you decide which screenings to have. They can also explain what the results might mean.   Answering questions - The physical is a good time to ask the doctor or nurse questions about your health. If needed, they can refer you to other doctors or specialists, too.  Adults older than 65 years often need other care, too. As you get older, your doctor will talk to you about:   How to prevent falling at " home   Hearing or vision tests   Memory testing   How to take your medicines safely   Making sure that you have the help and support you need at home  All topics are updated as new evidence becomes available and our peer review process is complete.  This topic retrieved from [employer] on: May 02, 2024.  Topic 171670 Version 1.0  Release: 32.4.3 - C32.122  © 2024 UpToDate, Inc. and/or its affiliates. All rights reserved.  Consumer Information Use and Disclaimer   Disclaimer: This generalized information is a limited summary of diagnosis, treatment, and/or medication information. It is not meant to be comprehensive and should be used as a tool to help the user understand and/or assess potential diagnostic and treatment options. It does NOT include all information about conditions, treatments, medications, side effects, or risks that may apply to a specific patient. It is not intended to be medical advice or a substitute for the medical advice, diagnosis, or treatment of a health care provider based on the health care provider's examination and assessment of a patient's specific and unique circumstances. Patients must speak with a health care provider for complete information about their health, medical questions, and treatment options, including any risks or benefits regarding use of medications. This information does not endorse any treatments or medications as safe, effective, or approved for treating a specific patient. UpToDate, Inc. and its affiliates disclaim any warranty or liability relating to this information or the use thereof.The use of this information is governed by the Terms of Use, available at https://www.[employer]tersStar[employer] Softwareuwer.com/en/know/clinical-effectiveness-terms. 2024© UpToDate, Inc. and its affiliates and/or licensors. All rights reserved.  Copyright   © 2024 UpToDate, Inc. and/or its affiliates. All rights reserved.     oral

## 2024-08-12 ENCOUNTER — CLINICAL SUPPORT (OUTPATIENT)
Dept: FAMILY MEDICINE CLINIC | Facility: CLINIC | Age: 19
End: 2024-08-12

## 2024-08-12 DIAGNOSIS — Z12.4 SCREENING FOR CERVICAL CANCER: ICD-10-CM

## 2024-08-12 DIAGNOSIS — Z00.00 ANNUAL PHYSICAL EXAM: ICD-10-CM

## 2024-08-12 PROCEDURE — 36415 COLL VENOUS BLD VENIPUNCTURE: CPT

## 2024-08-13 LAB
ALBUMIN SERPL-MCNC: 4 G/DL (ref 3.6–5.1)
ALBUMIN/GLOB SERPL: 1.7 (CALC) (ref 1–2.5)
ALP SERPL-CCNC: 33 U/L (ref 31–125)
ALT SERPL-CCNC: 12 U/L (ref 6–29)
AST SERPL-CCNC: 14 U/L (ref 10–30)
BASOPHILS # BLD AUTO: 50 CELLS/UL (ref 0–200)
BASOPHILS NFR BLD AUTO: 0.7 %
BILIRUB SERPL-MCNC: 0.4 MG/DL (ref 0.2–1.2)
BUN SERPL-MCNC: 14 MG/DL (ref 7–25)
BUN/CREAT SERPL: NORMAL (CALC) (ref 6–22)
CALCIUM SERPL-MCNC: 8.9 MG/DL (ref 8.6–10.2)
CHLORIDE SERPL-SCNC: 106 MMOL/L (ref 98–110)
CO2 SERPL-SCNC: 26 MMOL/L (ref 20–32)
CREAT SERPL-MCNC: 0.92 MG/DL (ref 0.5–0.96)
EOSINOPHIL # BLD AUTO: 439 CELLS/UL (ref 15–500)
EOSINOPHIL NFR BLD AUTO: 6.1 %
ERYTHROCYTE [DISTWIDTH] IN BLOOD BY AUTOMATED COUNT: 11.5 % (ref 11–15)
GFR/BSA.PRED SERPLBLD CYS-BASED-ARV: 91 ML/MIN/1.73M2
GLOBULIN SER CALC-MCNC: 2.3 G/DL (CALC) (ref 1.9–3.7)
GLUCOSE SERPL-MCNC: 86 MG/DL (ref 65–99)
HCT VFR BLD AUTO: 38.4 % (ref 35–45)
HGB BLD-MCNC: 12.9 G/DL (ref 11.7–15.5)
LYMPHOCYTES # BLD AUTO: 2657 CELLS/UL (ref 850–3900)
LYMPHOCYTES NFR BLD AUTO: 36.9 %
MCH RBC QN AUTO: 32.5 PG (ref 27–33)
MCHC RBC AUTO-ENTMCNC: 33.6 G/DL (ref 32–36)
MCV RBC AUTO: 96.7 FL (ref 80–100)
MONOCYTES # BLD AUTO: 526 CELLS/UL (ref 200–950)
MONOCYTES NFR BLD AUTO: 7.3 %
NEUTROPHILS # BLD AUTO: 3528 CELLS/UL (ref 1500–7800)
NEUTROPHILS NFR BLD AUTO: 49 %
PLATELET # BLD AUTO: 247 THOUSAND/UL (ref 140–400)
PMV BLD REES-ECKER: 9.6 FL (ref 7.5–12.5)
POTASSIUM SERPL-SCNC: 3.9 MMOL/L (ref 3.5–5.3)
PROT SERPL-MCNC: 6.3 G/DL (ref 6.1–8.1)
RBC # BLD AUTO: 3.97 MILLION/UL (ref 3.8–5.1)
SODIUM SERPL-SCNC: 139 MMOL/L (ref 135–146)
WBC # BLD AUTO: 7.2 THOUSAND/UL (ref 3.8–10.8)

## 2024-09-15 ENCOUNTER — APPOINTMENT (OUTPATIENT)
Age: 19
End: 2024-09-15

## 2024-10-14 ENCOUNTER — OFFICE VISIT (OUTPATIENT)
Age: 19
End: 2024-10-14
Payer: COMMERCIAL

## 2024-10-14 VITALS
HEIGHT: 65 IN | BODY MASS INDEX: 23.63 KG/M2 | OXYGEN SATURATION: 99 % | SYSTOLIC BLOOD PRESSURE: 126 MMHG | TEMPERATURE: 98.4 F | WEIGHT: 141.8 LBS | RESPIRATION RATE: 18 BRPM | HEART RATE: 98 BPM | DIASTOLIC BLOOD PRESSURE: 62 MMHG

## 2024-10-14 DIAGNOSIS — J02.9 SORE THROAT: ICD-10-CM

## 2024-10-14 DIAGNOSIS — J02.9 ACUTE PHARYNGITIS, UNSPECIFIED ETIOLOGY: Primary | ICD-10-CM

## 2024-10-14 LAB — S PYO AG THROAT QL: NEGATIVE

## 2024-10-14 PROCEDURE — 87880 STREP A ASSAY W/OPTIC: CPT | Performed by: NURSE PRACTITIONER

## 2024-10-14 PROCEDURE — G0382 LEV 3 HOSP TYPE B ED VISIT: HCPCS | Performed by: NURSE PRACTITIONER

## 2024-10-14 PROCEDURE — 87070 CULTURE OTHR SPECIMN AEROBIC: CPT | Performed by: NURSE PRACTITIONER

## 2024-10-14 PROCEDURE — S9083 URGENT CARE CENTER GLOBAL: HCPCS | Performed by: NURSE PRACTITIONER

## 2024-10-14 RX ORDER — AMOXICILLIN 500 MG/1
500 CAPSULE ORAL EVERY 12 HOURS SCHEDULED
Qty: 20 CAPSULE | Refills: 0 | Status: SHIPPED | OUTPATIENT
Start: 2024-10-14 | End: 2024-10-24

## 2024-10-14 NOTE — PROGRESS NOTES
[facility]        NAME: Cortney James is a 19 y.o. female  : 2005    MRN: [id number]  DATE: 2024  TIME: 9:47 AM    Assessment and Plan   Acute pharyngitis, unspecified etiology [J02.9]  1. Acute pharyngitis, unspecified etiology  POCT rapid ANTIGEN strepA    amoxicillin (AMOXIL) 500 mg capsule    CANCELED: Throat culture      2. Sore throat  Throat culture        Acute symptomatic POCT rapid strep was negative in office.  Patient does have a large past medical history of strep infections we will send throat culture.  Due to patient having past medical history of multiple strep infections and physical exam correlating with strep infection we will start amoxicillin twice daily x 10 days educated results will populate MyChart if comes back negative can stop antibiotic at that time if positive should continue to completion.  Patient verbalized understanding.    Patient Instructions       Follow up with PCP in 3-5 days.  Proceed to  ER if symptoms worsen.    If tests have been performed at [facility], our office will contact you with results if changes need to be made to the care plan discussed with you at the visit.  You can review your full results on [facility].    Chief Complaint     Chief Complaint   Patient presents with    Sore Throat     Since Friday.    Migraine         History of Present Illness       Sore Throat   This is a new problem. The current episode started in the past 7 days. The problem has been waxing and waning. Neither side of throat is experiencing more pain than the other. There has been no fever. The pain is at a severity of 5/10. The pain is moderate. Associated symptoms include congestion, headaches and trouble swallowing. Pertinent negatives include no abdominal pain, coughing, diarrhea, drooling, ear discharge, hoarse voice, plugged ear sensation, shortness of breath, stridor or vomiting. She has tried cool liquids and gargles for the symptoms. The  treatment provided no relief.       Review of Systems   Review of Systems   Constitutional:  Positive for fatigue. Negative for activity change, appetite change, chills and fever.   HENT:  Positive for congestion, sore throat and trouble swallowing. Negative for drooling, ear discharge, hoarse voice, postnasal drip, rhinorrhea and sneezing.    Respiratory:  Negative for cough, chest tightness, shortness of breath, wheezing and stridor.    Cardiovascular:  Negative for chest pain and palpitations.   Gastrointestinal:  Negative for abdominal pain, constipation, diarrhea, nausea and vomiting.   Musculoskeletal:  Negative for arthralgias and myalgias.   Skin:  Negative for color change, pallor and rash.   Neurological:  Positive for headaches. Negative for dizziness, weakness and light-headedness.   Hematological:  Negative for adenopathy.   Psychiatric/Behavioral:  Negative for agitation and confusion.          Current Medications       Current Outpatient Medications:     amoxicillin (AMOXIL) 500 mg capsule, Take 1 capsule (500 mg total) by mouth every 12 (twelve) hours for 10 days, Disp: 20 capsule, Rfl: 0    fluticasone (Flonase Allergy Relief) 50 mcg/act nasal spray, , Disp: , Rfl:     levocetirizine (XYZAL) 5 MG tablet, Take 5 mg by mouth, Disp: , Rfl:     norethindrone-ethinyl estradiol (Junel FE 1/20) 1-20 MG-MCG per tablet, Take 1 tablet by mouth daily, Disp: 84 tablet, Rfl: 3    triamcinolone (KENALOG) 0.5 % cream, Apply topically 2 (two) times a day, Disp: 15 g, Rfl: 0    Current Allergies     Allergies as of 10/14/2024 - Reviewed 10/14/2024   Allergen Reaction Noted    Pollen extract Other (See Comments) 08/21/2015            The following portions of the patient's history were reviewed and updated as appropriate: allergies, current medications, past family history, past medical history, past social history, past surgical history and problem list.     History reviewed. No pertinent past medical  "history.    Past Surgical History:   Procedure Laterality Date    ELBOW SURGERY      WISDOM TOOTH EXTRACTION         Family History   Problem Relation Age of Onset    Diabetes Father     Hypertension Father     Skin cancer Father     Ovarian cancer Maternal Grandmother     Cancer Maternal Grandmother         Ovraian Cancer         Medications have been verified.        Objective   /62   Pulse 98   Temp 98.4 °F (36.9 °C) (Tympanic)   Resp 18   Ht 5' 5\" (1.651 m)   Wt 64.3 kg (141 lb 12.8 oz)   SpO2 99%   BMI 23.60 kg/m²   No LMP recorded.       Physical Exam     Physical Exam  Vitals and nursing note reviewed.   Constitutional:       General: She is not in acute distress.     Appearance: Normal appearance. She is ill-appearing. She is not diaphoretic.   HENT:      Head: Normocephalic and atraumatic.      Nose: No congestion or rhinorrhea.      Mouth/Throat:      Pharynx: Uvula midline. Pharyngeal swelling, posterior oropharyngeal erythema and uvula swelling present. No oropharyngeal exudate.      Tonsils: No tonsillar exudate or tonsillar abscesses. 3+ on the right. 3+ on the left.   Eyes:      General: No scleral icterus.        Right eye: No discharge.         Left eye: No discharge.   Pulmonary:      Effort: Pulmonary effort is normal. No respiratory distress.   Musculoskeletal:         General: Normal range of motion.      Cervical back: Normal range of motion.   Skin:     Coloration: Skin is not jaundiced or pale.      Findings: No bruising, erythema or rash.   Neurological:      General: No focal deficit present.      Mental Status: She is alert and oriented to person, place, and time.   Psychiatric:         Mood and Affect: Mood normal.         Behavior: Behavior normal.         Thought Content: Thought content normal.         Judgment: Judgment normal.                   "

## 2024-10-16 LAB — BACTERIA THROAT CULT: NORMAL

## 2024-12-11 DIAGNOSIS — N94.6 MENSTRUAL CRAMPS: ICD-10-CM

## 2024-12-11 RX ORDER — NORETHINDRONE ACETATE AND ETHINYL ESTRADIOL AND FERROUS FUMARATE 1MG-20(21)
1 KIT ORAL DAILY
Qty: 28 TABLET | Refills: 0 | Status: SHIPPED | OUTPATIENT
Start: 2024-12-11

## 2024-12-23 ENCOUNTER — OFFICE VISIT (OUTPATIENT)
Age: 19
End: 2024-12-23
Payer: COMMERCIAL

## 2024-12-23 VITALS
HEART RATE: 126 BPM | HEIGHT: 65 IN | TEMPERATURE: 99.5 F | WEIGHT: 138 LBS | RESPIRATION RATE: 18 BRPM | OXYGEN SATURATION: 98 % | DIASTOLIC BLOOD PRESSURE: 98 MMHG | SYSTOLIC BLOOD PRESSURE: 116 MMHG | BODY MASS INDEX: 22.99 KG/M2

## 2024-12-23 DIAGNOSIS — J02.9 ACUTE PHARYNGITIS, UNSPECIFIED ETIOLOGY: Primary | ICD-10-CM

## 2024-12-23 LAB
S PYO AG THROAT QL: NEGATIVE
SARS-COV-2 AG UPPER RESP QL IA: NEGATIVE
VALID CONTROL: NORMAL

## 2024-12-23 PROCEDURE — G0382 LEV 3 HOSP TYPE B ED VISIT: HCPCS | Performed by: NURSE PRACTITIONER

## 2024-12-23 PROCEDURE — 87811 SARS-COV-2 COVID19 W/OPTIC: CPT | Performed by: NURSE PRACTITIONER

## 2024-12-23 PROCEDURE — S9083 URGENT CARE CENTER GLOBAL: HCPCS | Performed by: NURSE PRACTITIONER

## 2024-12-23 PROCEDURE — 87880 STREP A ASSAY W/OPTIC: CPT | Performed by: NURSE PRACTITIONER

## 2024-12-23 RX ORDER — AMOXICILLIN 500 MG/1
500 CAPSULE ORAL EVERY 12 HOURS SCHEDULED
Qty: 20 CAPSULE | Refills: 0 | Status: SHIPPED | OUTPATIENT
Start: 2024-12-23 | End: 2025-01-02

## 2024-12-23 NOTE — PROGRESS NOTES
[facility]        NAME: Cortney James is a 19 y.o. female  : 2005    MRN: [id number]  DATE: 2024  TIME: 11:42 AM    Assessment and Plan   Acute pharyngitis, unspecified etiology [J02.9]  1. Acute pharyngitis, unspecified etiology  POCT rapid ANTIGEN strepA    Poct Covid 19 Rapid Antigen Test    amoxicillin (AMOXIL) 500 mg capsule    Throat culture    Throat culture        Acute symptomatic POCT rapid strep and rapid COVID were negative in office.  Will send throat culture.  Symptoms do correlate with strep infection we will start the amoxicillin twice daily x 7 days educated on side effects proper use of medication if throat culture results return negative can stop at that time if positive should continue to completion.    Patient Instructions       Follow up with PCP in 3-5 days.  Proceed to  ER if symptoms worsen.    If tests have been performed at [facility], our office will contact you with results if changes need to be made to the care plan discussed with you at the visit.  You can review your full results on [facility].    Chief Complaint     Chief Complaint   Patient presents with   • Sore Throat     Started Saturday and has been getting worse ever since. Nose is stuffy and she has a fever the previous night, and chills. Took ibuprofen which hasn't seem to be working.          History of Present Illness       Sore Throat   This is a new problem. The current episode started in the past 7 days. The problem has been gradually worsening. The maximum temperature recorded prior to her arrival was 100 - 100.9 F. The fever has been present for Less than 1 day. The pain is at a severity of 7/10. The pain is mild. Associated symptoms include congestion, coughing, headaches, swollen glands and trouble swallowing. Pertinent negatives include no abdominal pain, diarrhea, drooling, ear discharge, ear pain, hoarse voice, plugged ear sensation, neck pain, shortness of breath, stridor  or vomiting.       Review of Systems   Review of Systems   Constitutional:  Positive for fever. Negative for activity change, appetite change, chills and fatigue.   HENT:  Positive for congestion, sore throat and trouble swallowing. Negative for drooling, ear discharge, ear pain, hoarse voice, postnasal drip, rhinorrhea and sneezing.    Respiratory:  Positive for cough. Negative for chest tightness, shortness of breath, wheezing and stridor.    Cardiovascular:  Negative for chest pain and palpitations.   Gastrointestinal:  Negative for abdominal pain, constipation, diarrhea, nausea and vomiting.   Musculoskeletal:  Negative for arthralgias, myalgias and neck pain.   Skin:  Negative for color change, pallor and rash.   Neurological:  Positive for headaches. Negative for dizziness, weakness and light-headedness.   Hematological:  Negative for adenopathy.   Psychiatric/Behavioral:  Negative for agitation and confusion.          Current Medications       Current Outpatient Medications:   •  amoxicillin (AMOXIL) 500 mg capsule, Take 1 capsule (500 mg total) by mouth every 12 (twelve) hours for 10 days, Disp: 20 capsule, Rfl: 0  •  levocetirizine (XYZAL) 5 MG tablet, Take 5 mg by mouth, Disp: , Rfl:   •  norethindrone-ethinyl estradiol (Aurovela FE 1/20) 1-20 MG-MCG per tablet, TAKE 1 TABLET BY MOUTH EVERY DAY, Disp: 28 tablet, Rfl: 0  •  triamcinolone (KENALOG) 0.5 % cream, Apply topically 2 (two) times a day, Disp: 15 g, Rfl: 0  •  fluticasone (Flonase Allergy Relief) 50 mcg/act nasal spray, , Disp: , Rfl:     Current Allergies     Allergies as of 12/23/2024 - Reviewed 12/23/2024   Allergen Reaction Noted   • Pollen extract Other (See Comments) 08/21/2015            The following portions of the patient's history were reviewed and updated as appropriate: allergies, current medications, past family history, past medical history, past social history, past surgical history and problem list.     History reviewed. No  "pertinent past medical history.    Past Surgical History:   Procedure Laterality Date   • ELBOW SURGERY     • WISDOM TOOTH EXTRACTION         Family History   Problem Relation Age of Onset   • Diabetes Father    • Hypertension Father    • Skin cancer Father    • Ovarian cancer Maternal Grandmother    • Cancer Maternal Grandmother         Ovraian Cancer         Medications have been verified.        Objective   /98   Pulse (!) 126   Temp 99.5 °F (37.5 °C) (Tympanic)   Resp 18   Ht 5' 5\" (1.651 m)   Wt 62.6 kg (138 lb)   LMP 12/11/2024   SpO2 98%   BMI 22.96 kg/m²   Patient's last menstrual period was 12/11/2024.       Physical Exam     Physical Exam  Vitals and nursing note reviewed.   Constitutional:       General: She is not in acute distress.     Appearance: Normal appearance. She is ill-appearing. She is not diaphoretic.   HENT:      Head: Normocephalic and atraumatic.      Right Ear: Tympanic membrane, ear canal and external ear normal. There is no impacted cerumen.      Left Ear: Tympanic membrane, ear canal and external ear normal. There is no impacted cerumen.      Nose: Congestion present. No rhinorrhea.      Mouth/Throat:      Pharynx: Posterior oropharyngeal erythema present.      Tonsils: No tonsillar exudate or tonsillar abscesses.   Eyes:      General: No scleral icterus.        Right eye: No discharge.         Left eye: No discharge.      Conjunctiva/sclera: Conjunctivae normal.   Cardiovascular:      Rate and Rhythm: Regular rhythm. Tachycardia present.   Pulmonary:      Effort: Pulmonary effort is normal. No respiratory distress.      Breath sounds: No stridor. No wheezing, rhonchi or rales.   Musculoskeletal:         General: Normal range of motion.      Cervical back: Normal range of motion.   Lymphadenopathy:      Cervical: Cervical adenopathy present.   Skin:     Coloration: Skin is not jaundiced or pale.      Findings: No bruising, erythema or rash.   Neurological:      General: No " focal deficit present.      Mental Status: She is alert and oriented to person, place, and time.   Psychiatric:         Mood and Affect: Mood normal.         Behavior: Behavior normal.         Thought Content: Thought content normal.         Judgment: Judgment normal.

## 2024-12-24 NOTE — PROGRESS NOTES
Patient here for ROUTINE GYN EXAM.    Patient is 19 y.o. year old female G 0P 0.  She is here today for her routine gyn exam. She is using OCP for birth control/ dysmenorrhea.  Patient's last menstrual period was 12/11/2024.. Menses every 28 days, duration 3 days.  Pt does not smoke.  She denies alcohol/drug abuse.  She denies domestic violence/abuse.   She denies problems with her breasts, bladder, or bowel.      Patient started with some vaginal irritation a few days ago.  Taking amoxil for strep infection.  No discharge, no odor.    Reviewed the USPSTF guidelines recommending gonorrhea and chlamydia testing for all sexually active women 24 years old or younger.   Patient elects gonorrhea/ chlamydia testing.   Patient declines STD blood work including HIV.    She is unsure if completed the gardasil vaccine series.       Domestic violence screen: negative    Family History breast cancer:  no  Family History ovarian cancer: MGM  Family History colon cancer: no    Patient completed genetic testing:  Test Ordered: [employer]: Cancer® +[employer]® (59 genes): APC, JOAN, AXIN2, BAP1, BARD1, BMPR1A, BRCA1, BRCA2, BRIP1, CDH1, CDK4, CDKN1B, CDKN2A, CHEK2, CTNNA1, DICER1, EGLN1, EPCAM, FH, FLCN, GREM1, HOXB13, KIF1B, KIT, MAX, MEN1, MET, MITF, MLH1, MSH2, MSH3, MSH6, MUTYH, NF1, NTHL1, PALB2, PDGFRA PMS2, POLD1, POLE, POT1, PTEN, RAD51C, RAD51D, RB1, RET, SDHA, SDHAF2, SDHB, SDHC, SDHD, SMAD4, SMARCA4, STK11, GOKP491, TP53, TSC1, TSC2, VHL       Result: Negative - No Clinically Significant Variants Detected           Review of Systems   Constitutional: Negative.    HENT: Negative.    Eyes: Negative.    Respiratory: Negative.    Cardiovascular: Negative.    Gastrointestinal: Negative.    Endocrine: Negative.    Genitourinary:        As noted in HPI   Musculoskeletal: Negative.    Skin: Negative.    Allergic/Immunologic: Negative.    Neurological: Negative.    Hematological: Negative.    Psychiatric/Behavioral:  "Negative      Current Outpatient Medications on File Prior to Visit   Medication Sig Dispense Refill    amoxicillin (AMOXIL) 500 mg capsule Take 1 capsule (500 mg total) by mouth every 12 (twelve) hours for 10 days 20 capsule 0    fluticasone (Flonase Allergy Relief) 50 mcg/act nasal spray  (Patient not taking: Reported on 12/23/2024)      levocetirizine (XYZAL) 5 MG tablet Take 5 mg by mouth      norethindrone-ethinyl estradiol (Aurovela FE 1/20) 1-20 MG-MCG per tablet TAKE 1 TABLET BY MOUTH EVERY DAY 28 tablet 0    triamcinolone (KENALOG) 0.5 % cream Apply topically 2 (two) times a day 15 g 0     No current facility-administered medications on file prior to visit.       Allergies   Allergen Reactions    Pollen Extract Other (See Comments)     Per mom, runny nose, watery, itchy eyes.       Past Surgical History:   Procedure Laterality Date    ELBOW SURGERY      WISDOM TOOTH EXTRACTION             Vitals:    12/27/24 0823   BP: 122/72   BP Location: Right arm   Patient Position: Sitting   Cuff Size: Standard   Pulse: 98   SpO2: 99%   Weight: 63.2 kg (139 lb 6.4 oz)   Height: 5' 5\" (1.651 m)       Chaperone was present during exam.    EXAM:    Neck: supple without nodes or thyromegaly  Heart: regular rate and rhythm  Lungs: clear to auscultation without rales, rhonci  Breasts: nontender, no masses, no discoloration, no discharge  Abdomen: soft, nontender, no masses  Ext genitalia: no lesions, no discoloration  Urethra: no discharge or erythema  Bladder: nontender, good support  Vagina: no discharge, no lesions  Cervix: white clumpy discharge, no lesions, no cervical motion tenderness  Adnexa: nontender, no masses  Uterus: nontender, normal size and shape    Wet mount:  Whiff test: negative  Candida: +  Clue cells: negative  Trichomonas: negative    Assessment & Plan  Well female exam with routine gynecological exam  Pap collected  OCP for dysmenorrhea  Patient will confirm if HPV vaccine completed, aware of " recommendation to complete vaccine.       Menstrual cramps    Orders:    norethindrone-ethinyl estradiol (Aurovela FE 1/20) 1-20 MG-MCG per tablet; Take 1 tablet by mouth daily    Vulvovaginitis    Orders:    fluconazole (DIFLUCAN) 150 mg tablet; Take one tablet now and then repeat in 3-4 days    POCT wet mount  Follow up if no improvement or worsening symptoms.    Screening for STDs (sexually transmitted diseases)    Orders:    Chlamydia/GC amplified DNA by PCR

## 2024-12-27 ENCOUNTER — ANNUAL EXAM (OUTPATIENT)
Age: 19
End: 2024-12-27
Payer: COMMERCIAL

## 2024-12-27 ENCOUNTER — RESULTS FOLLOW-UP (OUTPATIENT)
Age: 19
End: 2024-12-27

## 2024-12-27 VITALS
SYSTOLIC BLOOD PRESSURE: 122 MMHG | DIASTOLIC BLOOD PRESSURE: 72 MMHG | OXYGEN SATURATION: 99 % | BODY MASS INDEX: 23.22 KG/M2 | WEIGHT: 139.4 LBS | HEART RATE: 98 BPM | HEIGHT: 65 IN

## 2024-12-27 DIAGNOSIS — N94.6 MENSTRUAL CRAMPS: ICD-10-CM

## 2024-12-27 DIAGNOSIS — Z12.4 SCREENING FOR CERVICAL CANCER: ICD-10-CM

## 2024-12-27 DIAGNOSIS — Z01.419 WELL FEMALE EXAM WITH ROUTINE GYNECOLOGICAL EXAM: Primary | ICD-10-CM

## 2024-12-27 DIAGNOSIS — Z11.3 SCREENING FOR STDS (SEXUALLY TRANSMITTED DISEASES): ICD-10-CM

## 2024-12-27 DIAGNOSIS — N76.0 VULVOVAGINITIS: ICD-10-CM

## 2024-12-27 LAB
BV WHIFF TEST VAG QL: NEGATIVE
CLUE CELLS SPEC QL WET PREP: NEGATIVE
T VAGINALIS VAG QL WET PREP: NEGATIVE
YEAST VAG QL WET PREP: POSITIVE

## 2024-12-27 PROCEDURE — 99213 OFFICE O/P EST LOW 20 MIN: CPT | Performed by: PHYSICIAN ASSISTANT

## 2024-12-27 PROCEDURE — 87591 N.GONORRHOEAE DNA AMP PROB: CPT | Performed by: PHYSICIAN ASSISTANT

## 2024-12-27 PROCEDURE — G0145 SCR C/V CYTO,THINLAYER,RESCR: HCPCS | Performed by: PHYSICIAN ASSISTANT

## 2024-12-27 PROCEDURE — 87491 CHLMYD TRACH DNA AMP PROBE: CPT | Performed by: PHYSICIAN ASSISTANT

## 2024-12-27 PROCEDURE — 87210 SMEAR WET MOUNT SALINE/INK: CPT | Performed by: PHYSICIAN ASSISTANT

## 2024-12-27 PROCEDURE — S0612 ANNUAL GYNECOLOGICAL EXAMINA: HCPCS | Performed by: PHYSICIAN ASSISTANT

## 2024-12-27 RX ORDER — FLUCONAZOLE 150 MG/1
TABLET ORAL
Qty: 2 TABLET | Refills: 0 | Status: SHIPPED | OUTPATIENT
Start: 2024-12-27 | End: 2025-01-03

## 2024-12-27 RX ORDER — NORETHINDRONE ACETATE AND ETHINYL ESTRADIOL 1MG-20(21)
1 KIT ORAL DAILY
Qty: 84 TABLET | Refills: 3 | Status: SHIPPED | OUTPATIENT
Start: 2024-12-27

## 2024-12-27 NOTE — ASSESSMENT & PLAN NOTE
Orders:    norethindrone-ethinyl estradiol (Aurovela FE 1/20) 1-20 MG-MCG per tablet; Take 1 tablet by mouth daily

## 2024-12-31 LAB
C TRACH DNA SPEC QL NAA+PROBE: NEGATIVE
LAB AP GYN PRIMARY INTERPRETATION: NORMAL
Lab: NORMAL
N GONORRHOEA DNA SPEC QL NAA+PROBE: NEGATIVE

## 2025-01-02 ENCOUNTER — TELEPHONE (OUTPATIENT)
Dept: FAMILY MEDICINE CLINIC | Facility: CLINIC | Age: 20
End: 2025-01-02

## 2025-01-02 NOTE — TELEPHONE ENCOUNTER
Left message for PT to advise that physical form is completed and scanned into her chart. We need to know if PT needs a PPD as part of the physical form completion.

## 2025-04-18 ENCOUNTER — OFFICE VISIT (OUTPATIENT)
Age: 20
End: 2025-04-18
Payer: COMMERCIAL

## 2025-04-18 VITALS
OXYGEN SATURATION: 98 % | HEIGHT: 65 IN | HEART RATE: 91 BPM | SYSTOLIC BLOOD PRESSURE: 130 MMHG | WEIGHT: 138.4 LBS | DIASTOLIC BLOOD PRESSURE: 66 MMHG | BODY MASS INDEX: 23.06 KG/M2

## 2025-04-18 DIAGNOSIS — N64.59 INVERTED NIPPLE: Primary | ICD-10-CM

## 2025-04-18 PROCEDURE — 99213 OFFICE O/P EST LOW 20 MIN: CPT | Performed by: OBSTETRICS & GYNECOLOGY

## 2025-04-18 NOTE — ASSESSMENT & PLAN NOTE
Likely physiologic glandular excretions present within each nipple due to inversion. No milk or other abnormal substance able to be expressed. No evidence of infection of garner tubercles or breast. Reassurance provided

## 2025-04-18 NOTE — PROGRESS NOTES
"    Subjective   Patient ID: Cortney James is a 19 y.o. female.    Patient is here for a problem visit.    Chief Complaint   Patient presents with    Breast Problem     Right nipple discharge, white creamy.      About 1 week ago noticed creamy \"discharge\" from right nipple. Area does not appear milky, more of a caseous substance although denies purulence   Denies pain in right breast or nipple, no redness   Denies recent trauma/piercings  Denies issues with left breast  Denies prior similar sx         Menstrual History:  OB History          0    Para   0    Term   0       0    AB   0    Living   0         SAB   0    IAB   0    Ectopic   0    Multiple   0    Live Births   0                  Patient's last menstrual period was 2025 (exact date).         Past Medical History:   Diagnosis Date    Seasonal allergies        Past Surgical History:   Procedure Laterality Date    ELBOW SURGERY      WISDOM TOOTH EXTRACTION         Social History     Tobacco Use    Smoking status: Never    Smokeless tobacco: Never   Vaping Use    Vaping status: Never Used   Substance Use Topics    Alcohol use: Yes     Comment: occasionally    Drug use: Never        Allergies   Allergen Reactions    Pollen Extract Other (See Comments)     Per mom, runny nose, watery, itchy eyes.         Current Outpatient Medications:     norethindrone-ethinyl estradiol (Aurovela FE ) 1-20 MG-MCG per tablet, Take 1 tablet by mouth daily, Disp: 84 tablet, Rfl: 3    triamcinolone (KENALOG) 0.5 % cream, Apply topically 2 (two) times a day, Disp: 15 g, Rfl: 0    fluticasone (Flonase Allergy Relief) 50 mcg/act nasal spray, , Disp: , Rfl:     levocetirizine (XYZAL) 5 MG tablet, Take 5 mg by mouth, Disp: , Rfl:       Review of Systems   Constitutional:  Negative for appetite change, chills and fever.   Eyes:  Negative for visual disturbance.   Respiratory:  Negative for cough, chest tightness and shortness of breath.    Cardiovascular:  " "Negative for chest pain.   Gastrointestinal:  Negative for abdominal distention, abdominal pain, constipation, diarrhea, nausea and vomiting.   Endocrine: Negative for cold intolerance and heat intolerance.   Genitourinary:  Negative for difficulty urinating, dyspareunia, dysuria, frequency, genital sores, pelvic pain, urgency, vaginal bleeding, vaginal discharge and vaginal pain.   Musculoskeletal:  Negative for arthralgias.   Neurological:  Negative for light-headedness and headaches.   Hematological:  Does not bruise/bleed easily.   Psychiatric/Behavioral:  Negative for behavioral problems.    All other systems reviewed and are negative.        /66 (BP Location: Right arm, Patient Position: Sitting, Cuff Size: Adult)   Pulse 91   Ht 5' 5\" (1.651 m)   Wt 62.8 kg (138 lb 6.4 oz)   LMP 03/01/2025 (Exact Date)   SpO2 98%   BMI 23.03 kg/m²       Physical Exam  Constitutional:       General: She is not in acute distress.     Appearance: Normal appearance.   Genitourinary:      Genitourinary Comments: Bilateral nipple inversion. Small amount of caseous white discharge noted within each nipple. No milky, bloody or other substance able to be expressed. No erythema/induration of either breast or areola    Breasts:     Breasts are soft.     Right: Inverted nipple present. No swelling, bleeding, mass, nipple discharge, skin change or tenderness.      Left: Inverted nipple present. No swelling, bleeding, mass, nipple discharge, skin change or tenderness.   HENT:      Head: Normocephalic and atraumatic.   Cardiovascular:      Rate and Rhythm: Normal rate.   Pulmonary:      Effort: Pulmonary effort is normal. No respiratory distress.   Lymphadenopathy:      Upper Body:      Right upper body: No supraclavicular or axillary adenopathy.      Left upper body: No supraclavicular or axillary adenopathy.   Neurological:      General: No focal deficit present.      Mental Status: She is alert.   Psychiatric:         Mood " and Affect: Mood normal.         Behavior: Behavior normal.   Vitals and nursing note reviewed.               Appropriate laboratory testing, imaging studies, and prior external records were reviewed:     Assessment/Plan:       Problem List Items Addressed This Visit       Inverted nipple - Primary    Likely physiologic glandular excretions present within each nipple due to inversion. No milk or other abnormal substance able to be expressed. No evidence of infection of garner tubercles or breast. Reassurance provided

## 2025-06-20 ENCOUNTER — TELEPHONE (OUTPATIENT)
Dept: FAMILY MEDICINE CLINIC | Facility: CLINIC | Age: 20
End: 2025-06-20

## 2025-08-09 ENCOUNTER — OFFICE VISIT (OUTPATIENT)
Age: 20
End: 2025-08-09
Payer: COMMERCIAL

## 2025-08-09 VITALS
OXYGEN SATURATION: 99 % | DIASTOLIC BLOOD PRESSURE: 54 MMHG | TEMPERATURE: 97.2 F | HEART RATE: 94 BPM | SYSTOLIC BLOOD PRESSURE: 114 MMHG | HEIGHT: 65 IN | RESPIRATION RATE: 18 BRPM | WEIGHT: 137 LBS | BODY MASS INDEX: 22.82 KG/M2

## 2025-08-09 DIAGNOSIS — J02.9 SORE THROAT: Primary | ICD-10-CM

## 2025-08-09 LAB — S PYO AG THROAT QL: NEGATIVE

## 2025-08-09 PROCEDURE — G0382 LEV 3 HOSP TYPE B ED VISIT: HCPCS | Performed by: NURSE PRACTITIONER

## 2025-08-09 PROCEDURE — 87070 CULTURE OTHR SPECIMN AEROBIC: CPT | Performed by: NURSE PRACTITIONER

## 2025-08-09 PROCEDURE — S9083 URGENT CARE CENTER GLOBAL: HCPCS | Performed by: NURSE PRACTITIONER

## 2025-08-09 PROCEDURE — 87880 STREP A ASSAY W/OPTIC: CPT | Performed by: NURSE PRACTITIONER

## 2025-08-09 RX ORDER — AMOXICILLIN 500 MG/1
500 CAPSULE ORAL EVERY 12 HOURS SCHEDULED
Qty: 20 CAPSULE | Refills: 0 | Status: SHIPPED | OUTPATIENT
Start: 2025-08-09 | End: 2025-08-19

## 2025-08-10 LAB — BACTERIA THROAT CULT: NORMAL

## 2025-08-11 LAB — BACTERIA THROAT CULT: NORMAL
